# Patient Record
Sex: FEMALE | Race: WHITE | Employment: OTHER | URBAN - METROPOLITAN AREA
[De-identification: names, ages, dates, MRNs, and addresses within clinical notes are randomized per-mention and may not be internally consistent; named-entity substitution may affect disease eponyms.]

---

## 2017-03-23 ENCOUNTER — HOSPITAL ENCOUNTER (OUTPATIENT)
Dept: LAB | Age: 82
Discharge: HOME | End: 2017-03-23
Payer: MEDICARE

## 2017-03-23 LAB
BASOPHILS # BLD AUTO: 0 K/UL (ref 0–0.06)
BASOPHILS # BLD: 1 % (ref 0–2)
DIFFERENTIAL METHOD BLD: ABNORMAL
EOSINOPHIL # BLD: 0.1 K/UL (ref 0–0.4)
EOSINOPHIL NFR BLD: 2 % (ref 0–5)
ERYTHROCYTE [DISTWIDTH] IN BLOOD BY AUTOMATED COUNT: 12.5 % (ref 11.6–14.5)
HCT VFR BLD AUTO: 45 % (ref 35–45)
HGB BLD-MCNC: 15.3 G/DL (ref 12–16)
LYMPHOCYTES # BLD AUTO: 34 % (ref 21–52)
LYMPHOCYTES # BLD: 2 K/UL (ref 0.9–3.6)
MCH RBC QN AUTO: 31.7 PG (ref 24–34)
MCHC RBC AUTO-ENTMCNC: 34 G/DL (ref 31–37)
MCV RBC AUTO: 93.4 FL (ref 74–97)
MONOCYTES # BLD: 0.6 K/UL (ref 0.05–1.2)
MONOCYTES NFR BLD AUTO: 11 % (ref 3–10)
NEUTS SEG # BLD: 3 K/UL (ref 1.8–8)
NEUTS SEG NFR BLD AUTO: 52 % (ref 40–73)
PLATELET # BLD AUTO: 279 K/UL (ref 135–420)
PMV BLD AUTO: 10 FL (ref 9.2–11.8)
RBC # BLD AUTO: 4.82 M/UL (ref 4.2–5.3)
WBC # BLD AUTO: 5.8 K/UL (ref 4.6–13.2)

## 2017-03-23 PROCEDURE — 86162 COMPLEMENT TOTAL (CH50): CPT | Performed by: INTERNAL MEDICINE

## 2017-03-23 PROCEDURE — 82787 IGG 1 2 3 OR 4 EACH: CPT | Performed by: INTERNAL MEDICINE

## 2017-03-23 PROCEDURE — 86360 T CELL ABSOLUTE COUNT/RATIO: CPT | Performed by: INTERNAL MEDICINE

## 2017-03-23 PROCEDURE — 82784 ASSAY IGA/IGD/IGG/IGM EACH: CPT | Performed by: INTERNAL MEDICINE

## 2017-03-23 PROCEDURE — 36415 COLL VENOUS BLD VENIPUNCTURE: CPT | Performed by: INTERNAL MEDICINE

## 2017-03-23 PROCEDURE — 85025 COMPLETE CBC W/AUTO DIFF WBC: CPT | Performed by: INTERNAL MEDICINE

## 2017-03-24 LAB
BASOPHILS # BLD AUTO: 0 X10E3/UL (ref 0–0.2)
BASOPHILS NFR BLD AUTO: 1 %
CD3+CD4+ CELLS # BLD: 1189 /UL (ref 359–1519)
CD3+CD4+ CELLS NFR BLD: 56.6 % (ref 30.8–58.5)
CD3+CD4+ CELLS/CD3+CD8+ CLL BLD: 4.26 % (ref 0.92–3.72)
CD3+CD8+ CELLS # BLD: 279 /UL (ref 109–897)
CD3+CD8+ CELLS NFR BLD: 13.3 % (ref 12–35.5)
CH50 SERPL-ACNC: >60 U/ML (ref 42–60)
EOSINOPHIL # BLD AUTO: 0.2 X10E3/UL (ref 0–0.4)
EOSINOPHIL NFR BLD AUTO: 3 %
ERYTHROCYTE [DISTWIDTH] IN BLOOD BY AUTOMATED COUNT: 13.5 % (ref 12.3–15.4)
HCT VFR BLD AUTO: 44.1 % (ref 34–46.6)
HGB BLD-MCNC: 15.2 G/DL (ref 11.1–15.9)
IMM GRANULOCYTES # BLD: 0 X10E3/UL (ref 0–0.1)
IMM GRANULOCYTES NFR BLD: 0 %
LYMPHOCYTES # BLD AUTO: 2.1 X10E3/UL (ref 0.7–3.1)
LYMPHOCYTES NFR BLD AUTO: 35 %
MCH RBC QN AUTO: 31.9 PG (ref 26.6–33)
MCHC RBC AUTO-ENTMCNC: 34.5 G/DL (ref 31.5–35.7)
MCV RBC AUTO: 93 FL (ref 79–97)
MONOCYTES # BLD AUTO: 0.7 X10E3/UL (ref 0.1–0.9)
MONOCYTES NFR BLD AUTO: 12 %
NEUTROPHILS # BLD AUTO: 3 X10E3/UL (ref 1.4–7)
NEUTROPHILS NFR BLD AUTO: 49 %
PLATELET # BLD AUTO: 299 X10E3/UL (ref 150–379)
RBC # BLD AUTO: 4.76 X10E6/UL (ref 3.77–5.28)
WBC # BLD AUTO: 6 X10E3/UL (ref 3.4–10.8)

## 2017-03-25 LAB
IGG SERPL-MCNC: 806 MG/DL (ref 700–1600)
IGG1 SER-MCNC: 441 MG/DL (ref 422–1292)
IGG2 SER-MCNC: 216 MG/DL (ref 117–747)
IGG3 SER-MCNC: 55 MG/DL (ref 41–129)
IGG4 SER-MCNC: 5 MG/DL (ref 1–291)

## 2017-03-27 LAB
IGA SERPL-MCNC: 274 MG/DL (ref 64–422)
IGG SERPL-MCNC: 762 MG/DL (ref 700–1600)
IGM SERPL-MCNC: 61 MG/DL (ref 26–217)
PROT PATTERN SERPL IFE-IMP: NORMAL

## 2022-10-11 ENCOUNTER — OFFICE VISIT (OUTPATIENT)
Dept: ORTHOPEDIC SURGERY | Age: 87
End: 2022-10-11
Payer: MEDICARE

## 2022-10-11 DIAGNOSIS — M25.511 RIGHT SHOULDER PAIN, UNSPECIFIED CHRONICITY: Primary | ICD-10-CM

## 2022-10-11 DIAGNOSIS — S42.224A CLOSED 2-PART NONDISPLACED FRACTURE OF SURGICAL NECK OF RIGHT HUMERUS, INITIAL ENCOUNTER: ICD-10-CM

## 2022-10-11 PROBLEM — K58.9 IBS (IRRITABLE BOWEL SYNDROME): Status: ACTIVE | Noted: 2017-12-11

## 2022-10-11 PROBLEM — R42 VERTIGO: Status: ACTIVE | Noted: 2018-10-23

## 2022-10-11 PROBLEM — M79.671 BILATERAL FOOT PAIN: Status: ACTIVE | Noted: 2021-07-15

## 2022-10-11 PROBLEM — L84 CALLUS OF FOOT: Status: ACTIVE | Noted: 2022-02-25

## 2022-10-11 PROBLEM — R29.6 RECURRENT FALLS: Status: ACTIVE | Noted: 2022-09-24

## 2022-10-11 PROBLEM — T78.40XA ALLERGY: Status: ACTIVE | Noted: 2018-05-16

## 2022-10-11 PROBLEM — E87.1 CHRONIC HYPONATREMIA: Status: ACTIVE | Noted: 2022-07-14

## 2022-10-11 PROBLEM — M79.672 BILATERAL FOOT PAIN: Status: ACTIVE | Noted: 2021-07-15

## 2022-10-11 PROBLEM — E55.9 VITAMIN D DEFICIENCY: Status: ACTIVE | Noted: 2021-01-13

## 2022-10-11 PROBLEM — M25.9 MULTIPLE JOINT COMPLAINTS: Status: ACTIVE | Noted: 2021-01-13

## 2022-10-11 PROBLEM — M25.561 CHRONIC PAIN OF BOTH KNEES: Status: ACTIVE | Noted: 2021-07-15

## 2022-10-11 PROBLEM — G89.29 CHRONIC PAIN OF BOTH KNEES: Status: ACTIVE | Noted: 2021-07-15
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
## 2022-10-11 PROBLEM — K44.9 HIATAL HERNIA: Status: ACTIVE | Noted: 2019-09-13

## 2022-10-11 PROBLEM — M25.562 CHRONIC PAIN OF BOTH KNEES: Status: ACTIVE | Noted: 2021-07-15

## 2022-10-11 PROBLEM — S42.214A CLOSED NONDISPLACED FRACTURE OF SURGICAL NECK OF RIGHT HUMERUS: Status: ACTIVE | Noted: 2022-09-24

## 2022-10-11 PROBLEM — R55 SYNCOPE: Status: ACTIVE | Noted: 2021-11-21

## 2022-10-11 PROBLEM — M81.0 AGE-RELATED OSTEOPOROSIS WITHOUT CURRENT PATHOLOGICAL FRACTURE: Status: ACTIVE | Noted: 2018-09-20

## 2022-10-11 PROCEDURE — G8536 NO DOC ELDER MAL SCRN: HCPCS | Performed by: ORTHOPAEDIC SURGERY

## 2022-10-11 PROCEDURE — 99203 OFFICE O/P NEW LOW 30 MIN: CPT | Performed by: ORTHOPAEDIC SURGERY

## 2022-10-11 PROCEDURE — G9717 DOC PT DX DEP/BP F/U NT REQ: HCPCS | Performed by: ORTHOPAEDIC SURGERY

## 2022-10-11 PROCEDURE — 23600 CLTX PROX HUMRL FX W/O MNPJ: CPT | Performed by: ORTHOPAEDIC SURGERY

## 2022-10-11 PROCEDURE — 1101F PT FALLS ASSESS-DOCD LE1/YR: CPT | Performed by: ORTHOPAEDIC SURGERY

## 2022-10-11 PROCEDURE — 1090F PRES/ABSN URINE INCON ASSESS: CPT | Performed by: ORTHOPAEDIC SURGERY

## 2022-10-11 PROCEDURE — G8427 DOCREV CUR MEDS BY ELIG CLIN: HCPCS | Performed by: ORTHOPAEDIC SURGERY

## 2022-10-11 PROCEDURE — 1123F ACP DISCUSS/DSCN MKR DOCD: CPT | Performed by: ORTHOPAEDIC SURGERY

## 2022-10-11 PROCEDURE — G8421 BMI NOT CALCULATED: HCPCS | Performed by: ORTHOPAEDIC SURGERY

## 2022-10-11 RX ORDER — PANTOPRAZOLE SODIUM 40 MG/1
40 TABLET, DELAYED RELEASE ORAL
COMMUNITY
Start: 2022-09-27 | End: 2022-10-27

## 2022-10-11 RX ORDER — BUDESONIDE AND FORMOTEROL FUMARATE DIHYDRATE 80; 4.5 UG/1; UG/1
2 AEROSOL RESPIRATORY (INHALATION)
COMMUNITY

## 2022-10-11 RX ORDER — METHYLPREDNISOLONE 4 MG/1
TABLET ORAL
COMMUNITY
Start: 2022-07-18

## 2022-10-11 RX ORDER — SODIUM CHLORIDE TAB 1 GM 1 G
1 TAB MISCELLANEOUS 2 TIMES DAILY WITH MEALS
COMMUNITY
Start: 2022-09-26 | End: 2022-10-26

## 2022-10-11 RX ORDER — POTASSIUM CHLORIDE 750 MG/1
TABLET, FILM COATED, EXTENDED RELEASE ORAL
COMMUNITY
Start: 2022-09-26

## 2022-10-11 RX ORDER — CELECOXIB 100 MG/1
100 CAPSULE ORAL 2 TIMES DAILY
COMMUNITY
Start: 2022-08-31

## 2022-10-11 RX ORDER — BUSPIRONE HYDROCHLORIDE 5 MG/1
TABLET ORAL
COMMUNITY
Start: 2022-07-14

## 2022-10-11 RX ORDER — OMEPRAZOLE 40 MG/1
CAPSULE, DELAYED RELEASE ORAL
COMMUNITY
Start: 2022-07-20

## 2022-10-11 RX ORDER — DICLOFENAC SODIUM 30 MG/G
100 GEL TOPICAL 2 TIMES DAILY
COMMUNITY
Start: 2022-07-14

## 2022-10-11 RX ORDER — ERGOCALCIFEROL 1.25 MG/1
CAPSULE ORAL
COMMUNITY
Start: 2022-07-14

## 2022-10-11 RX ORDER — ACETAMINOPHEN, DIPHENHYDRAMINE HCL, PHENYLEPHRINE HCL 325; 25; 5 MG/1; MG/1; MG/1
1 TABLET ORAL
COMMUNITY

## 2022-10-11 NOTE — PROGRESS NOTES
Name: Zaheer Bhardwaj    : 1935     Service Dept: [address] [place] [address] and Sports Medicine    Chief Complaint   Patient presents with    Shoulder Pain    Wrist Pain    Arm Pain        There were no vitals taken for this visit. Allergies   Allergen Reactions    Erythromycin Hives, Itching and Swelling    Codeine Other (comments)    Doxycycline Itching    Penicillins Hives and Itching     Other reaction(s): unknown        Current Outpatient Medications   Medication Sig Dispense Refill    diclofenac (SOLARAZE) 3 % topical gel Apply 100 g to affected area two (2) times a day. pantoprazole (PROTONIX) 40 mg tablet Take 40 mg by mouth.      sodium chloride 1 gram tablet Take 1 g by mouth two (2) times daily (with meals). budesonide-formoteroL (SYMBICORT) 80-4.5 mcg/actuation HFAA Take 2 Puffs by inhalation. busPIRone (BUSPAR) 5 mg tablet       celecoxib (CELEBREX) 100 mg capsule Take 100 mg by mouth two (2) times a day. Vitamin D2 1,250 mcg (50,000 unit) capsule       melatonin 10 mg tab Take 1 Tablet by mouth nightly as needed. methylPREDNISolone (MEDROL DOSEPACK) 4 mg tablet use as directed DO NOT TAKE NSAIDS WITH THIS MEDICATION. IF DIABECTIC MONITOR BLOOD SUGARS CLOSELY      potassium chloride SR (KLOR-CON 10) 10 mEq tablet       tiotropium (SPIRIVA) 18 mcg inhalation capsule Take 1 Capsule by inhalation daily. omeprazole (PRILOSEC) 40 mg capsule       albuterol (PROVENTIL HFA, VENTOLIN HFA, PROAIR HFA) 90 mcg/actuation inhaler Take 2 Puffs by inhalation every four (4) hours as needed for Wheezing. citalopram (CELEXA) 20 mg tablet Take 20 mg by mouth daily. montelukast (SINGULAIR) 10 mg tablet Take 10 mg by mouth daily. simvastatin (ZOCOR) 40 mg tablet Take 40 mg by mouth nightly. B.infantis-B.ani-B.long-B.bifi (PROBIOTIC 4X) 10-15 mg Tab Take  by mouth daily.         fluticasone (FLONASE) 50 mcg/actuation nasal spray 2 Sprays by Both Nostrils route daily. CETIRIZINE HCL (ZYRTEC PO) Take  by mouth daily. Patient Active Problem List   Diagnosis Code    Hallux rigidus, acquired M20.20    Chronic pain of both knees M25.561, M25.562, G89.29    Callus of foot L84    Bronchial asthma J45.909    Bilateral foot pain M79.671, M79.672    Allergy T78.40XA    Age-related osteoporosis without current pathological fracture M81.0    Accidental fall W19. XXXA    Vertigo R42    DJD (degenerative joint disease) M19.90    Closed nondisplaced fracture of surgical neck of right humerus S42.214A    Chronic hyponatremia E87.1    Hypercholesterolemia E78.00    Hiatal hernia K44.9    GERD (gastroesophageal reflux disease) K21.9    Recurrent falls R29.6    Neck pain M54.2    Multiple joint complaints M25.9    Moderate persistent asthma without complication B99.41    Anxiety and depression F41.9, F32.A    IBS (irritable bowel syndrome) K58.9    Vitamin D deficiency E55.9    Syncope R55      History reviewed. No pertinent family history. Social History     Socioeconomic History    Marital status:    Tobacco Use    Smoking status: Never    Smokeless tobacco: Never   Substance and Sexual Activity    Alcohol use: Yes     Comment: seldom    Drug use: No      Past Surgical History:   Procedure Laterality Date    HX GYN      vaginal repair      Past Medical History:   Diagnosis Date    Arthritis     Asthma     Diverticulitis     hx of    GERD (gastroesophageal reflux disease)     Hiatal hernia     Nausea & vomiting         I have reviewed and agree with PFSH and ROS and intake form in chart and the record furthermore I have reviewed prior medical record(s) regarding this patients care during this appointment.      Review of Systems:   Patient is a pleasant appearing individual, appropriately dressed, well hydrated, well nourished, who is alert, appropriately oriented for age, and in no acute distress with a wheelchair bound gait and normal affect who does not appear to be in any significant pain. Physical Exam:  Right upper extremity - neurovascularly intact with good cap refill, decreased range of motion actively and passively secondary to pain, positive weakness, positive tenderness to palpation in the area of the fracture, minimal swelling, positive echymosis, skin is intact. Left upper extremity - grossly neurovascularly intact. Full Range of motion, No Weakness with abduction, No Point Tenderness, No skin lesion are identified, No instabilty is noted, No apprehension. No cuts or abrasions are identified. Encounter Diagnoses     ICD-10-CM ICD-9-CM   1. Right shoulder pain, unspecified chronicity  M25.511 719.41   2. Closed 2-part nondisplaced fracture of surgical neck of right humerus, initial encounter  S42.224A 812.01       HPI:  The patient is here with a chief complaint of right shoulder pain, throbbing, burning pain, diagnosed with proximal neck fracture. Pain is 9/10. X-rays are positive for well aligned fracture. X-rays are positive for right two-parts fracture with slight displacement. Assessment/Plan:  Plan at this point, my recommendation will be for ice, elevate, antiinflammatories, physical therapy and we will see the patient back as needed or in 3 to  4 weeks for one final check. No restrictions in the meantime and go from there. As part of continued conservative pain management options the patient was advised to utilize Tylenol or OTC NSAIDS as long as it is not medically contraindicated. Return to Office: Follow-up and Dispositions    Return in about 4 weeks (around 11/8/2022) for w/ xrays, w/ Margaret Spurling. Scribed by Omari Smith LPN as dictated by [facility] JAM Kellogg MD.  Documentation True and Accepted Nitin Kellogg MD

## 2022-10-11 NOTE — PATIENT INSTRUCTIONS
Shoulder Pain: Care Instructions  Your Care Instructions     You can hurt your shoulder by using it too much during an activity, such as fishing or baseball. It can also happen as part of the everyday wear and tear of getting older. Shoulder injuries can be slow to heal, but your shoulder should get better with time. Your doctor may recommend a sling to rest your shoulder. If you have injured your shoulder, you may need testing and treatment. Follow-up care is a key part of your treatment and safety. Be sure to make and go to all appointments, and call your doctor if you are having problems. It's also a good idea to know your test results and keep a list of the medicines you take. How can you care for yourself at home? Take pain medicines exactly as directed. If the doctor gave you a prescription medicine for pain, take it as prescribed. If you are not taking a prescription pain medicine, ask your doctor if you can take an over-the-counter medicine. Do not take two or more pain medicines at the same time unless the doctor told you to. Many pain medicines contain acetaminophen, which is Tylenol. Too much acetaminophen (Tylenol) can be harmful. If your doctor recommends that you wear a sling, use it as directed. Do not take it off before your doctor tells you to. Put ice or a cold pack on the sore area for 10 to 20 minutes at a time. Put a thin cloth between the ice and your skin. If there is no swelling, you can put moist heat, a heating pad, or a warm cloth on your shoulder. Some doctors suggest alternating between hot and cold. Rest your shoulder for a few days. If your doctor recommends it, you can then begin gentle exercise of the shoulder, but do not lift anything heavy. When should you call for help? Call 911 anytime you think you may need emergency care. For example, call if:    You have chest pain or pressure. This may occur with:  Sweating. Shortness of breath. Nausea or vomiting.   Pain that spreads from the chest to the neck, jaw, or one or both shoulders or arms. Dizziness or lightheadedness. A fast or uneven pulse. After calling 911, chew 1 adult-strength aspirin. Wait for an ambulance. Do not try to drive yourself. Your arm or hand is cool or pale or changes color. Call your doctor now or seek immediate medical care if:    You have signs of infection, such as: Increased pain, swelling, warmth, or redness in your shoulder. Red streaks leading from a place on your shoulder. Pus draining from an area of your shoulder. Swollen lymph nodes in your neck, armpits, or groin. A fever. Watch closely for changes in your health, and be sure to contact your doctor if:    You cannot use your shoulder. Your shoulder does not get better as expected. Where can you learn more? Go to http://www.chou.com/  Enter H996 in the search box to learn more about \"Shoulder Pain: Care Instructions. \"  Current as of: July 1, 2021               Content Version: 13.2  © 2006-2022 [employer]. Care instructions adapted under license by [employer] (which disclaims liability or warranty for this information). If you have questions about a medical condition or this instruction, always ask your healthcare professional. [address] any warranty or liability for your use of this information.

## 2022-11-17 ENCOUNTER — OFFICE VISIT (OUTPATIENT)
Dept: ORTHOPEDIC SURGERY | Age: 87
End: 2022-11-17
Payer: MEDICARE

## 2022-11-17 DIAGNOSIS — S42.291D OTHER CLOSED DISPLACED FRACTURE OF PROXIMAL END OF RIGHT HUMERUS WITH ROUTINE HEALING, SUBSEQUENT ENCOUNTER: Primary | ICD-10-CM

## 2022-11-17 PROBLEM — I70.0 ATHEROSCLEROSIS OF AORTA (HCC): Status: ACTIVE | Noted: 2022-11-04

## 2022-11-17 PROCEDURE — 99024 POSTOP FOLLOW-UP VISIT: CPT | Performed by: NURSE PRACTITIONER

## 2022-11-17 RX ORDER — POTASSIUM CHLORIDE 20MEQ/15ML
LIQUID (ML) ORAL
COMMUNITY
Start: 2022-10-15

## 2022-11-17 RX ORDER — OXYCODONE HYDROCHLORIDE 5 MG/1
5 TABLET ORAL EVERY 12 HOURS
COMMUNITY
Start: 2022-10-26

## 2022-11-17 NOTE — PROGRESS NOTES
Name: Nick Reynoso    : 1935    Weight Loss Metrics 3/7/2016 2013 2013   Today's Wt 111 lb - 115 lb   BMI 20.98 kg/m2 21.74 kg/m2 -       There is no height or weight on file to calculate BMI. Service Dept: [address] [place] [address] and Sports Medicine    Patient's Pharmacies:  No Pharmacies Listed     Chief Complaint   Patient presents with    Shoulder Pain       HPI:  Patient follows up today for reevaluation and angelita-ray of a right proximal humerus fracture. She is almost 8 weeks out from that injury. She is no longer in a sling and has been attending physical therapy. Overall she states she is doing well with minimal discomfort. There were no vitals taken for this visit. Allergies   Allergen Reactions    Erythromycin Hives, Itching and Swelling    Codeine Other (comments)    Doxycycline Itching    Penicillins Hives and Itching     Other reaction(s): unknown  Other reaction(s): unknown  Other reaction(s): unknown      Current Outpatient Medications   Medication Sig Dispense Refill    oxyCODONE IR (ROXICODONE) 5 mg immediate release tablet Take 5 mg by mouth every twelve (12) hours. potassium chloride (KAON 10%) 20 mEq/15 mL solution TAKE 15ML BY MOUTH ONCE A DAY      budesonide-formoteroL (SYMBICORT) 80-4.5 mcg/actuation HFAA Take 2 Puffs by inhalation. busPIRone (BUSPAR) 5 mg tablet       celecoxib (CELEBREX) 100 mg capsule Take 100 mg by mouth two (2) times a day. diclofenac (SOLARAZE) 3 % topical gel Apply 100 g to affected area two (2) times a day. Vitamin D2 1,250 mcg (50,000 unit) capsule       melatonin 10 mg tab Take 1 Tablet by mouth nightly as needed. methylPREDNISolone (MEDROL DOSEPACK) 4 mg tablet use as directed DO NOT TAKE NSAIDS WITH THIS MEDICATION.  IF DIABECTIC MONITOR BLOOD SUGARS CLOSELY      potassium chloride SR (KLOR-CON 10) 10 mEq tablet       tiotropium (SPIRIVA) 18 mcg inhalation capsule Take 1 Capsule by inhalation daily.      omeprazole (PRILOSEC) 40 mg capsule       albuterol (PROVENTIL HFA, VENTOLIN HFA, PROAIR HFA) 90 mcg/actuation inhaler Take 2 Puffs by inhalation every four (4) hours as needed for Wheezing. citalopram (CELEXA) 20 mg tablet Take 20 mg by mouth daily. montelukast (SINGULAIR) 10 mg tablet Take 10 mg by mouth daily. simvastatin (ZOCOR) 40 mg tablet Take 40 mg by mouth nightly. B.infantis-B.ani-B.long-B.bifi (PROBIOTIC 4X) 10-15 mg Tab Take  by mouth daily. fluticasone (FLONASE) 50 mcg/actuation nasal spray 2 Sprays by Both Nostrils route daily. CETIRIZINE HCL (ZYRTEC PO) Take  by mouth daily. Patient Active Problem List   Diagnosis Code    Hallux rigidus, acquired M20.20    Chronic pain of both knees M25.561, M25.562, G89.29    Callus of foot L84    Bronchial asthma J45.909    Bilateral foot pain M79.671, M79.672    Allergy T78.40XA    Age-related osteoporosis without current pathological fracture M81.0    Accidental fall W19. XXXA    Vertigo R42    DJD (degenerative joint disease) M19.90    Closed nondisplaced fracture of surgical neck of right humerus S42.214A    Chronic hyponatremia E87.1    Hypercholesterolemia E78.00    Hiatal hernia K44.9    GERD (gastroesophageal reflux disease) K21.9    Recurrent falls R29.6    Neck pain M54.2    Multiple joint complaints M25.9    Moderate persistent asthma without complication X93.28    Anxiety and depression F41.9, F32.A    IBS (irritable bowel syndrome) K58.9    Vitamin D deficiency E55.9    Syncope R55    Atherosclerosis of aorta (HCC) I70.0      History reviewed. No pertinent family history.    Social History     Socioeconomic History    Marital status:    Tobacco Use    Smoking status: Never    Smokeless tobacco: Never   Substance and Sexual Activity    Alcohol use: Yes     Comment: seldom    Drug use: No      Past Surgical History:   Procedure Laterality Date    HX GYN      vaginal repair      Past Medical History:   Diagnosis Date    Arthritis     Asthma     Diverticulitis     hx of    GERD (gastroesophageal reflux disease)     Hiatal hernia     Nausea & vomiting         I have reviewed and agree with [address] and ROS and intake form in chart and the record furthermore I have reviewed prior medical record(s) regarding this patients care during this appointment. Physical Exam:  On physical exam today patient is able to forward elevate her arm to about 60 degrees. There is still some mild palpable tenderness over the proximal humerus. New x-rays taken today of the patient's right shoulder show her fracture to have good interval healing with good callus formation. Encounter Diagnoses     ICD-10-CM ICD-9-CM   1. Other closed displaced fracture of proximal end of right humerus with routine healing, subsequent encounter  S42.291D V54.11       As part of continued conservative pain management options the patient was advised to utilize Tylenol or OTC NSAIDS as long as it is not medically contraindicated. Return to Office:   Today I reviewed the clinical and radiographic findings with the patient and her daughter. She will continue with physical therapy and home exercises. I advised that she should abstain from weightbearing with the right arm for at least another month. If she feels good at that point she can begin unrestricted use of the right upper extremity. As long she is doing well no further routine x-rays will be scheduled but she can be in touch with us anytime if issues arise.          [address] [facility]

## 2023-11-19 PROBLEM — G56.01 RIGHT CARPAL TUNNEL SYNDROME: Status: ACTIVE | Noted: 2023-11-19

## 2023-11-19 PROBLEM — M25.531 RIGHT WRIST PAIN: Status: ACTIVE | Noted: 2023-11-19

## 2023-12-21 ENCOUNTER — HOSPITAL ENCOUNTER (OUTPATIENT)
Facility: HOSPITAL | Age: 88
Discharge: HOME | End: 2023-12-24
Payer: MEDICARE

## 2023-12-21 DIAGNOSIS — Z01.818 PRE-OP TESTING: ICD-10-CM

## 2023-12-21 LAB
APTT PPP: 32.3 SEC (ref 23–36.4)
INR PPP: 1 (ref 0.9–1.1)
PROTHROMBIN TIME: 13.6 SEC (ref 11.9–14.7)

## 2023-12-21 PROCEDURE — 85730 THROMBOPLASTIN TIME PARTIAL: CPT

## 2023-12-21 PROCEDURE — 36415 COLL VENOUS BLD VENIPUNCTURE: CPT

## 2023-12-21 PROCEDURE — 85610 PROTHROMBIN TIME: CPT

## 2024-03-14 PROBLEM — M65.30 TRIGGER FINGER, LEFT: Status: ACTIVE | Noted: 2024-03-14

## 2024-03-14 PROBLEM — G56.02 CARPAL TUNNEL SYNDROME ON LEFT: Status: ACTIVE | Noted: 2024-03-14

## 2024-03-14 NOTE — H&P
History and Physical        Patient: Donna Lynn               Sex: female          DOA: (Not on file)         YOB: 1935      Age:  88 y.o.        LOS:  LOS: 0 days        HPI:     Donna Lynn is a 88 y.o. female who complains of left hand and wrist pain.  She noticed pain and numbness and tingling in the left hand and fingers.  She states their hand does feel weak; therefore, they are very limited.  She rates their pain as 6/10.  It is a dull aching pain that is worse with any type of activity and feels better at rest.  She is also complaining that her 4th digit on the left will get stuck on her at times and she will have to release the finger for it to open up.  The patient has been taking NSAIDS.  They have obtained an EMG/nerve conduction study which shows  severe  carpal tunnel syndrome in their left hand.   The patient denies any other manifestations.  At this time the patient's pain effects their activities of daily living and would like to move forward with surgical intervention.       Past Medical History:   Diagnosis Date    Anxiety and depression 1993    Buspirone daily    Arthritis     \"all over\"    Asthma 1979    adult onset. Hx \"mold at my old house\" has albuterol inhaler PRN.    Chronic pain     \"all over\" hx steroid injections    Diverticulitis     hx of    GERD (gastroesophageal reflux disease)     Pantoprazole    Hard of hearing     \"I need hearing aids\" -doesn't have as of 12/18/23.    Hiatal hernia     History of abnormal electrocardiogram     bifascicular block on EKG- no cardiologist    History of low potassium     currently on oral K+    Hx of echocardiogram 11/21/2023    \"Normal LV systolic function, EF 65-70%%\"    Hx of fall 09/2022    fractured right shoulder/humerus- no surgery    Hyperlipidemia 1989    pt denies meds. PCP Luis in  [place]    Osteoporosis     Tylenol as needed. managed by Dr. Webb.    PONV (postoperative nausea and vomiting)     Right

## 2024-03-14 NOTE — DISCHARGE INSTRUCTIONS
[facility]  Dr. Rodriguez’s Post-Operative Instructions Carpal Tunnel Release    Diet:  1. Begin with liquids and light foods such as Jell-O and soups.  2. Advance as tolerated to your regular diet if not nauseated.    First 24 hours:  1. Be in the care of a responsible adult.  2. Do not drive or operate machinery.  3. Do not drink alcoholic beverages.    Activities:  1. Elevate the operative hand and ice for the next 48 hours; 20 minutes on / 20 minutes off  2.. Return to work depends on your type of employment.  3.  Follow-up with Dr. Rodriguez in 7-10 days post-operatively.    Wound Care:  1. Maintain your postoperative dressing. Loosen the ACE wrap if swelling of the fingers occurs.  2. Remove your surgical dressing on the third post op day. Cover the wound with Band-Aids and then place on the Velcro splint that was given pre-operatively.    3. Keep the surgical incisions dry until your sutures are removed when you see your doctor. Use a plastic bag with rubber bands to cover the limb during showers. Immersing the limb in water is to be avoided.    Medications:  1. Strong oral narcotic pain medications have been prescribed for the first few days. Use only as directed. No pain medication is capable of taking away all the pain. Taking your pills at regular intervals will give you the best chance of having less pain.  2. If you need a refill PLEASE PLAN AHEAD. Call our office during regular hours (8-5).  3. Do not combine with alcoholic beverages.  4. Be careful as you walk, climb stairs or drive as mild dizziness is not unusual.  5. Do not take medications that have not been prescribed by your surgeon.  6. You may switch to over the counter pain medication of your choice as you become more comfortable.    WHEN TO CALL YOUR SURGEON:  1. Significant swelling or any new numbness in the limb that was operated on  2. Unrelenting pain  3. Fever or Chills  4. Redness around incisions  5. Color change in the fingers or hand  6.

## 2024-03-18 ENCOUNTER — TRANSCRIBE ORDERS (OUTPATIENT)
Facility: HOSPITAL | Age: 89
End: 2024-03-18

## 2024-03-18 ENCOUNTER — HOSPITAL ENCOUNTER (OUTPATIENT)
Facility: HOSPITAL | Age: 89
Discharge: HOME | End: 2024-03-21
Payer: MEDICARE

## 2024-03-18 DIAGNOSIS — G56.02 CARPAL TUNNEL SYNDROME OF LEFT WRIST: ICD-10-CM

## 2024-03-18 DIAGNOSIS — G56.02 CARPAL TUNNEL SYNDROME OF LEFT WRIST: Primary | ICD-10-CM

## 2024-03-18 LAB
ALBUMIN SERPL-MCNC: 3.6 G/DL (ref 3.4–5)
ALBUMIN/GLOB SERPL: 1.2 (ref 0.8–1.7)
ALP SERPL-CCNC: 83 U/L (ref 45–117)
ALT SERPL-CCNC: 11 U/L (ref 13–56)
ANION GAP SERPL CALC-SCNC: 7 MMOL/L (ref 3–18)
APTT PPP: 30.9 SEC (ref 23–36.4)
AST SERPL-CCNC: 12 U/L (ref 10–38)
BILIRUB SERPL-MCNC: 0.8 MG/DL (ref 0.2–1)
BUN SERPL-MCNC: 5 MG/DL (ref 7–18)
BUN/CREAT SERPL: 9 (ref 12–20)
CALCIUM SERPL-MCNC: 9.4 MG/DL (ref 8.5–10.1)
CHLORIDE SERPL-SCNC: 96 MMOL/L (ref 100–111)
CO2 SERPL-SCNC: 27 MMOL/L (ref 21–32)
CREAT SERPL-MCNC: 0.58 MG/DL (ref 0.6–1.3)
EKG ATRIAL RATE: 66 BPM
EKG DIAGNOSIS: NORMAL
EKG P AXIS: 79 DEGREES
EKG P-R INTERVAL: 168 MS
EKG Q-T INTERVAL: 450 MS
EKG QRS DURATION: 148 MS
EKG QTC CALCULATION (BAZETT): 471 MS
EKG R AXIS: -38 DEGREES
EKG T AXIS: 60 DEGREES
EKG VENTRICULAR RATE: 66 BPM
ERYTHROCYTE [DISTWIDTH] IN BLOOD BY AUTOMATED COUNT: 12.5 % (ref 11.6–14.5)
GLOBULIN SER CALC-MCNC: 3.1 G/DL (ref 2–4)
GLUCOSE SERPL-MCNC: 103 MG/DL (ref 74–99)
HCT VFR BLD AUTO: 39.6 % (ref 35–45)
HGB BLD-MCNC: 13.8 G/DL (ref 12–16)
INR PPP: 1 (ref 0.9–1.1)
MCH RBC QN AUTO: 32.6 PG (ref 24–34)
MCHC RBC AUTO-ENTMCNC: 34.8 G/DL (ref 31–37)
MCV RBC AUTO: 93.6 FL (ref 78–100)
NRBC # BLD: 0 K/UL (ref 0–0.01)
NRBC BLD-RTO: 0 PER 100 WBC
PLATELET # BLD AUTO: 344 K/UL (ref 135–420)
PMV BLD AUTO: 8.7 FL (ref 9.2–11.8)
POTASSIUM SERPL-SCNC: 4 MMOL/L (ref 3.5–5.5)
PROT SERPL-MCNC: 6.7 G/DL (ref 6.4–8.2)
PROTHROMBIN TIME: 13.1 SEC (ref 11.9–14.7)
RBC # BLD AUTO: 4.23 M/UL (ref 4.2–5.3)
SODIUM SERPL-SCNC: 130 MMOL/L (ref 136–145)
WBC # BLD AUTO: 6.1 K/UL (ref 4.6–13.2)

## 2024-03-18 PROCEDURE — 80053 COMPREHEN METABOLIC PANEL: CPT

## 2024-03-18 PROCEDURE — 85027 COMPLETE CBC AUTOMATED: CPT

## 2024-03-18 PROCEDURE — 93005 ELECTROCARDIOGRAM TRACING: CPT

## 2024-03-18 PROCEDURE — 36415 COLL VENOUS BLD VENIPUNCTURE: CPT

## 2024-03-18 PROCEDURE — 85610 PROTHROMBIN TIME: CPT

## 2024-03-18 PROCEDURE — 85730 THROMBOPLASTIN TIME PARTIAL: CPT

## 2024-03-19 ENCOUNTER — ANESTHESIA EVENT (OUTPATIENT)
Facility: HOSPITAL | Age: 89
End: 2024-03-19
Payer: MEDICARE

## 2024-03-20 LAB
BACTERIA SPEC CULT: NORMAL
BACTERIA SPEC CULT: NORMAL
SERVICE CMNT-IMP: NORMAL

## 2024-03-26 ENCOUNTER — HOSPITAL ENCOUNTER (OUTPATIENT)
Facility: HOSPITAL | Age: 89
Setting detail: OUTPATIENT SURGERY
Discharge: HOME | End: 2024-03-26
Attending: ORTHOPAEDIC SURGERY | Admitting: ORTHOPAEDIC SURGERY
Payer: MEDICARE

## 2024-03-26 ENCOUNTER — ANESTHESIA (OUTPATIENT)
Facility: HOSPITAL | Age: 89
End: 2024-03-26
Payer: MEDICARE

## 2024-03-26 VITALS
RESPIRATION RATE: 16 BRPM | HEIGHT: 60 IN | HEART RATE: 81 BPM | WEIGHT: 97.9 LBS | DIASTOLIC BLOOD PRESSURE: 59 MMHG | BODY MASS INDEX: 19.22 KG/M2 | OXYGEN SATURATION: 98 % | TEMPERATURE: 98 F | SYSTOLIC BLOOD PRESSURE: 157 MMHG

## 2024-03-26 DIAGNOSIS — M65.342 TRIGGER RING FINGER OF LEFT HAND: Primary | ICD-10-CM

## 2024-03-26 LAB — SODIUM SERPL-SCNC: 132 MMOL/L (ref 136–145)

## 2024-03-26 PROCEDURE — 3600000012 HC SURGERY LEVEL 2 ADDTL 15MIN: Performed by: ORTHOPAEDIC SURGERY

## 2024-03-26 PROCEDURE — 7100000010 HC PHASE II RECOVERY - FIRST 15 MIN: Performed by: ORTHOPAEDIC SURGERY

## 2024-03-26 PROCEDURE — 6360000002 HC RX W HCPCS: Performed by: ORTHOPAEDIC SURGERY

## 2024-03-26 PROCEDURE — 2709999900 HC NON-CHARGEABLE SUPPLY: Performed by: ORTHOPAEDIC SURGERY

## 2024-03-26 PROCEDURE — 3700000001 HC ADD 15 MINUTES (ANESTHESIA): Performed by: ORTHOPAEDIC SURGERY

## 2024-03-26 PROCEDURE — 7100000001 HC PACU RECOVERY - ADDTL 15 MIN: Performed by: ORTHOPAEDIC SURGERY

## 2024-03-26 PROCEDURE — 7100000011 HC PHASE II RECOVERY - ADDTL 15 MIN: Performed by: ORTHOPAEDIC SURGERY

## 2024-03-26 PROCEDURE — A4217 STERILE WATER/SALINE, 500 ML: HCPCS | Performed by: ORTHOPAEDIC SURGERY

## 2024-03-26 PROCEDURE — 6360000002 HC RX W HCPCS: Performed by: ANESTHESIOLOGY

## 2024-03-26 PROCEDURE — 36415 COLL VENOUS BLD VENIPUNCTURE: CPT

## 2024-03-26 PROCEDURE — 2580000003 HC RX 258: Performed by: ORTHOPAEDIC SURGERY

## 2024-03-26 PROCEDURE — 7100000000 HC PACU RECOVERY - FIRST 15 MIN: Performed by: ORTHOPAEDIC SURGERY

## 2024-03-26 PROCEDURE — 3600000002 HC SURGERY LEVEL 2 BASE: Performed by: ORTHOPAEDIC SURGERY

## 2024-03-26 PROCEDURE — 3700000000 HC ANESTHESIA ATTENDED CARE: Performed by: ORTHOPAEDIC SURGERY

## 2024-03-26 PROCEDURE — 84295 ASSAY OF SERUM SODIUM: CPT

## 2024-03-26 PROCEDURE — 2500000003 HC RX 250 WO HCPCS: Performed by: ANESTHESIOLOGY

## 2024-03-26 RX ORDER — MAGNESIUM HYDROXIDE 1200 MG/15ML
LIQUID ORAL CONTINUOUS PRN
Status: COMPLETED | OUTPATIENT
Start: 2024-03-26 | End: 2024-03-26

## 2024-03-26 RX ORDER — LIDOCAINE HYDROCHLORIDE 20 MG/ML
INJECTION, SOLUTION EPIDURAL; INFILTRATION; INTRACAUDAL; PERINEURAL PRN
Status: DISCONTINUED | OUTPATIENT
Start: 2024-03-26 | End: 2024-03-26 | Stop reason: SDUPTHER

## 2024-03-26 RX ORDER — SODIUM CHLORIDE 0.9 % (FLUSH) 0.9 %
5-40 SYRINGE (ML) INJECTION EVERY 12 HOURS SCHEDULED
Status: DISCONTINUED | OUTPATIENT
Start: 2024-03-26 | End: 2024-03-26 | Stop reason: HOSPADM

## 2024-03-26 RX ORDER — GLYCOPYRROLATE 0.2 MG/ML
INJECTION INTRAMUSCULAR; INTRAVENOUS PRN
Status: DISCONTINUED | OUTPATIENT
Start: 2024-03-26 | End: 2024-03-26 | Stop reason: SDUPTHER

## 2024-03-26 RX ORDER — NALOXONE HYDROCHLORIDE 0.4 MG/ML
INJECTION, SOLUTION INTRAMUSCULAR; INTRAVENOUS; SUBCUTANEOUS PRN
Status: DISCONTINUED | OUTPATIENT
Start: 2024-03-26 | End: 2024-03-26 | Stop reason: HOSPADM

## 2024-03-26 RX ORDER — EPHEDRINE SULFATE/0.9% NACL/PF 50 MG/5 ML
SYRINGE (ML) INTRAVENOUS PRN
Status: DISCONTINUED | OUTPATIENT
Start: 2024-03-26 | End: 2024-03-26 | Stop reason: SDUPTHER

## 2024-03-26 RX ORDER — FENTANYL CITRATE 50 UG/ML
INJECTION, SOLUTION INTRAMUSCULAR; INTRAVENOUS PRN
Status: DISCONTINUED | OUTPATIENT
Start: 2024-03-26 | End: 2024-03-26 | Stop reason: SDUPTHER

## 2024-03-26 RX ORDER — TRAMADOL HYDROCHLORIDE 50 MG/1
50 TABLET ORAL EVERY 8 HOURS PRN
Qty: 15 TABLET | Refills: 0 | Status: SHIPPED | OUTPATIENT
Start: 2024-03-26 | End: 2024-03-31

## 2024-03-26 RX ORDER — SULFAMETHOXAZOLE AND TRIMETHOPRIM 800; 160 MG/1; MG/1
1 TABLET ORAL 2 TIMES DAILY
Qty: 10 TABLET | Refills: 0 | Status: SHIPPED | OUTPATIENT
Start: 2024-03-26 | End: 2024-03-31

## 2024-03-26 RX ORDER — SODIUM CHLORIDE 9 MG/ML
INJECTION, SOLUTION INTRAVENOUS PRN
Status: DISCONTINUED | OUTPATIENT
Start: 2024-03-26 | End: 2024-03-26 | Stop reason: HOSPADM

## 2024-03-26 RX ORDER — PROPOFOL 10 MG/ML
INJECTION, EMULSION INTRAVENOUS PRN
Status: DISCONTINUED | OUTPATIENT
Start: 2024-03-26 | End: 2024-03-26 | Stop reason: SDUPTHER

## 2024-03-26 RX ORDER — BUPIVACAINE HYDROCHLORIDE 2.5 MG/ML
INJECTION, SOLUTION EPIDURAL; INFILTRATION; INTRACAUDAL PRN
Status: DISCONTINUED | OUTPATIENT
Start: 2024-03-26 | End: 2024-03-26 | Stop reason: ALTCHOICE

## 2024-03-26 RX ORDER — FENTANYL CITRATE 50 UG/ML
25 INJECTION, SOLUTION INTRAMUSCULAR; INTRAVENOUS EVERY 5 MIN PRN
Status: DISCONTINUED | OUTPATIENT
Start: 2024-03-26 | End: 2024-03-26 | Stop reason: HOSPADM

## 2024-03-26 RX ORDER — TRAMADOL HYDROCHLORIDE 50 MG/1
50 TABLET ORAL ONCE
Status: DISCONTINUED | OUTPATIENT
Start: 2024-03-26 | End: 2024-03-26 | Stop reason: HOSPADM

## 2024-03-26 RX ORDER — HYDROMORPHONE HYDROCHLORIDE 1 MG/ML
0.5 INJECTION, SOLUTION INTRAMUSCULAR; INTRAVENOUS; SUBCUTANEOUS EVERY 5 MIN PRN
Status: DISCONTINUED | OUTPATIENT
Start: 2024-03-26 | End: 2024-03-26 | Stop reason: HOSPADM

## 2024-03-26 RX ORDER — ONDANSETRON 2 MG/ML
INJECTION INTRAMUSCULAR; INTRAVENOUS PRN
Status: DISCONTINUED | OUTPATIENT
Start: 2024-03-26 | End: 2024-03-26 | Stop reason: SDUPTHER

## 2024-03-26 RX ORDER — SODIUM CHLORIDE, SODIUM LACTATE, POTASSIUM CHLORIDE, CALCIUM CHLORIDE 600; 310; 30; 20 MG/100ML; MG/100ML; MG/100ML; MG/100ML
INJECTION, SOLUTION INTRAVENOUS CONTINUOUS
Status: DISCONTINUED | OUTPATIENT
Start: 2024-03-26 | End: 2024-03-26 | Stop reason: HOSPADM

## 2024-03-26 RX ORDER — SODIUM CHLORIDE 0.9 % (FLUSH) 0.9 %
5-40 SYRINGE (ML) INJECTION PRN
Status: DISCONTINUED | OUTPATIENT
Start: 2024-03-26 | End: 2024-03-26 | Stop reason: HOSPADM

## 2024-03-26 RX ADMIN — Medication 10 MG: at 13:16

## 2024-03-26 RX ADMIN — ONDANSETRON HYDROCHLORIDE 4 MG: 2 INJECTION INTRAMUSCULAR; INTRAVENOUS at 12:54

## 2024-03-26 RX ADMIN — VANCOMYCIN HYDROCHLORIDE 1000 MG: 1 INJECTION, POWDER, LYOPHILIZED, FOR SOLUTION INTRAVENOUS at 12:53

## 2024-03-26 RX ADMIN — PROPOFOL 25 MG: 10 INJECTION, EMULSION INTRAVENOUS at 13:08

## 2024-03-26 RX ADMIN — LIDOCAINE HYDROCHLORIDE 60 MG: 20 INJECTION, SOLUTION EPIDURAL; INFILTRATION; INTRACAUDAL; PERINEURAL at 13:05

## 2024-03-26 RX ADMIN — SODIUM CHLORIDE, POTASSIUM CHLORIDE, SODIUM LACTATE AND CALCIUM CHLORIDE: 600; 310; 30; 20 INJECTION, SOLUTION INTRAVENOUS at 12:10

## 2024-03-26 RX ADMIN — FENTANYL CITRATE 25 MCG: 50 INJECTION, SOLUTION INTRAMUSCULAR; INTRAVENOUS at 12:54

## 2024-03-26 RX ADMIN — FENTANYL CITRATE 25 MCG: 50 INJECTION INTRAMUSCULAR; INTRAVENOUS at 14:23

## 2024-03-26 RX ADMIN — FENTANYL CITRATE 25 MCG: 50 INJECTION INTRAMUSCULAR; INTRAVENOUS at 14:29

## 2024-03-26 RX ADMIN — PROPOFOL 100 MG: 10 INJECTION, EMULSION INTRAVENOUS at 13:05

## 2024-03-26 RX ADMIN — Medication 10 MG: at 13:14

## 2024-03-26 RX ADMIN — GLYCOPYRROLATE 0.2 MG: 0.2 INJECTION, SOLUTION INTRAMUSCULAR; INTRAVENOUS at 12:54

## 2024-03-26 ASSESSMENT — PAIN DESCRIPTION - DESCRIPTORS: DESCRIPTORS: ACHING

## 2024-03-26 ASSESSMENT — PAIN DESCRIPTION - ORIENTATION: ORIENTATION: LEFT

## 2024-03-26 ASSESSMENT — PAIN SCALES - GENERAL
PAINLEVEL_OUTOF10: 4
PAINLEVEL_OUTOF10: 0
PAINLEVEL_OUTOF10: 0
PAINLEVEL_OUTOF10: 6
PAINLEVEL_OUTOF10: 0
PAINLEVEL_OUTOF10: 6
PAINLEVEL_OUTOF10: 0

## 2024-03-26 ASSESSMENT — PAIN - FUNCTIONAL ASSESSMENT
PAIN_FUNCTIONAL_ASSESSMENT: ACTIVITIES ARE NOT PREVENTED

## 2024-03-26 ASSESSMENT — ENCOUNTER SYMPTOMS: SHORTNESS OF BREATH: 1

## 2024-03-26 ASSESSMENT — PAIN DESCRIPTION - LOCATION: LOCATION: HAND

## 2024-03-26 NOTE — INTERVAL H&P NOTE
Update History & Physical    The patient's History and Physical of March 26, chart was reviewed and I will examine the patient. There was no change. The surgical site was confirmed by the patient and me.     Plan: The risks, benefits, expected outcome, and alternative to the recommended procedure have been discussed with the patient. Patient understands and wants to proceed with the procedure.     Electronically signed by Zachery Bhakta MD on 3/26/2024 at 11:44 AM

## 2024-03-26 NOTE — PERIOP NOTE
TRANSFER - OUT REPORT:    Verbal report given to Rocio GONZALES  on Donna Lynn  being transferred to [facility]  for routine progression of patient care       Report consisted of patient's Situation, Background, Assessment and   Recommendations(SBAR).     Information from the following report(s) Adult Overview, Surgery Report, Intake/Output, and MAR was reviewed with the receiving nurse.           Lines:   Peripheral IV 03/26/24 Posterior;Right Wrist (Active)   Site Assessment Clean, dry & intact 03/26/24 1346   Line Status Infusing 03/26/24 1346   Phlebitis Assessment No symptoms 03/26/24 1210   Infiltration Assessment 0 03/26/24 1210   Alcohol Cap Used No 03/26/24 1210   Dressing Status Clean, dry & intact 03/26/24 1210   Dressing Type Transparent 03/26/24 1210        Opportunity for questions and clarification was provided.      Patient transported with:  Registered Nurse

## 2024-03-26 NOTE — ANESTHESIA PRE PROCEDURE
\"HEPCAB\"    COVID-19 Screening (If Applicable): No results found for: \"COVID19\"        Anesthesia Evaluation  Patient summary reviewed and Nursing notes reviewed   no history of anesthetic complications:   Airway: Mallampati: II  TM distance: >3 FB   Neck ROM: full  Mouth opening: > = 3 FB   Dental:          Pulmonary: breath sounds clear to auscultation  (+)   shortness of breath: chronic,         asthma: exercise-induced asthma,                            Cardiovascular:  Exercise tolerance: good (>4 METS)  (+) MONTEIRO:      ECG reviewed  Rhythm: regular  Rate: normal                    Neuro/Psych:   (+) neuromuscular disease:, psychiatric history:            GI/Hepatic/Renal:   (+) hiatal hernia, GERD:          Endo/Other:                      ROS comment: Sodium disorder hypnatremia Abdominal:             Vascular:          Other Findings:       Anesthesia Plan      general     ASA 3       Induction: intravenous.      Anesthetic plan and risks discussed with patient.                    ENOCH SPENCE MD   3/26/2024

## 2024-03-26 NOTE — PERIOP NOTE
Reviewed PTA medication list with patient/caregiver and patient/caregiver denies any additional medications.     Patient admits to having a responsible adult care for them at home for at least 24 hours after surgery.    Patient encouraged to use gown warming system and informed that using said warming gown to regulate body temperature prior to a procedure has been shown to help reduce the risks of blood clots and infection.    Patient's pharmacy of choice verified and documented in PTA medication section.    Dual skin assessment & fall risk band verification completed with TYE Rodgers RN.

## 2024-03-26 NOTE — ANESTHESIA POSTPROCEDURE EVALUATION
Department of Anesthesiology  Postprocedure Note    Patient: Donna Lynn  MRN: [id number]  YOB: 1935  Date of evaluation: 3/26/2024    Procedure Summary       Date: 03/26/24 Room / Location: [facility] MAIN 08 / [facility] MAIN OR    Anesthesia Start: 1254 Anesthesia Stop: 1334    Procedures:       LEFT OPEN CARPAL TUNNEL RELEASE, (Left: Hand)      FOURTH DIGIT LEFT HAND TRIGGER FINGER RELEASE  (SPEC POP) (Left: Fingers) Diagnosis:       Carpal tunnel syndrome on left      (Carpal tunnel syndrome on left [G56.02])    Surgeons: Zachery Rodriguez MD Responsible Provider: Nina Fuentes MD    Anesthesia Type: General ASA Status: 3            Anesthesia Type: General    Steve Phase I:      Steve Phase II:      Anesthesia Post Evaluation    Patient location during evaluation: PACU  Patient participation: complete - patient participated  Level of consciousness: awake  Pain score: 0  Airway patency: patent  Nausea & Vomiting: no nausea and no vomiting  Cardiovascular status: blood pressure returned to baseline  Respiratory status: acceptable  Hydration status: stable  Multimodal analgesia pain management approach  Pain management: satisfactory to patient    No notable events documented.

## 2024-03-26 NOTE — PERIOP NOTE
TRANSFER - IN REPORT:    Verbal report received from ANDRE Burnette RN on Donna Lynn  being received from PACU for routine post-op      Report consisted of patient's Situation, Background, Assessment and   Recommendations(SBAR).     Information from the following report(s) Nurse Handoff Report, Adult Overview, Surgery Report, and Intake/Output was reviewed with the receiving nurse.    Opportunity for questions and clarification was provided.      Assessment completed upon patient's arrival to unit and care assumed.

## 2024-03-26 NOTE — PERIOP NOTE
Discharge instructions reviewed with patient and family.  Opportunity for questions and answers given.  No further questions at this time.    Encouraged coughing and deep breathing - tolerating po fluids - vss

## 2024-03-26 NOTE — PERIOP NOTE
TRANSFER - IN REPORT:    Verbal report received from Dr. Fuentes, OR nurse julio Lynn  being received from OR  for routine progression of patient care      Report consisted of patient's Situation, Background, Assessment and   Recommendations(SBAR).     Information from the following report(s) Adult Overview, Surgery Report, Intake/Output, and MAR was reviewed with the receiving nurse.    Opportunity for questions and clarification was provided.      Assessment completed upon patient's arrival to unit and care assumed.

## 2024-03-26 NOTE — OP NOTE
OPERATIVE NOTE    Patient: Donna Lynn MRN: [id number]  SSN: [id number]    YOB: 1935  Age: 88 y.o.  Sex: female      Indications: This is a 88 y.o. year-old female who presents with a left carpal tunnel and 4th digit trigger.  The patient was admitted for surgery as conservative measures have failed.    Date of Procedure: 3/26/2024     Preoperative Diagnosis: Carpal tunnel syndrome on left [G56.02], Trigger finger left hand 4th (ring) digit.     Postoperative Diagnosis: * No post-op diagnosis entered *      Procedure: Procedure(s):  LEFT OPEN CARPAL TUNNEL RELEASE,  FOURTH DIGIT LEFT HAND TRIGGER FINGER RELEASE  (SPEC POP)    Surgeon: Zachery Bhakta DO and Surgical Assistant: Oxana Ta NP    Assistant: Circulator: Natalie Rajan RN; Renetta Nagy RN  Nurse Practitioner: Oxana Ta APRN - NP  Surgical Assistant: Zachery Brooks  Scrub Person First: Sunita James      Anesthesia: GETA    Estimated Blood Loss: <10cc    Specimens: * No specimens in log *     Drains: none    Implants: * No implants in log *    Complications: None; patient tolerated the procedure well.    Procedure: The patient was greeted by anesthesia and taken to the operative suite, where she underwent general endotracheal anesthesia.  The patient was positioned in the supine position on a standard orthopedic table.  The left arm was sterilely prepped and draped in a standard fashion.  The arm was exsanguinated with an Esmarch bandage, which was utilized as a tourniquet.  A 1-inch incision was made over the palmar flexion crease in line with the radial aspect of the fourth ray.  This was made with a 15 scalpel.  The palmar fascia was transected.  Retractors were positioned .  The central aspect of the deep transverse carpal ligament was respected utilizing a 15 blade scalpel.  A freer elevator was passed into the carpal tunnel above the tendinous and neurological structures, and the distal aspect of the deep transverse carpal

## (undated) DEVICE — GOWN,SIRUS,NONRNF,SETINSLV,2XL,18/CS: Brand: MEDLINE

## (undated) DEVICE — GLOVE SURG SZ 65 CRM LTX FREE POLYISOPRENE POLYMER BEAD ANTI

## (undated) DEVICE — BNDG,ELSTC,MATRIX,STRL,3"X5YD,LF,HOOK&LP: Brand: MEDLINE

## (undated) DEVICE — 3M™ TEGADERM™ TRANSPARENT FILM DRESSING FRAME STYLE, 1626W, 4 IN X 4-3/4 IN (10 CM X 12 CM), 50/CT 4CT/CASE: Brand: 3M™ TEGADERM™

## (undated) DEVICE — SUTURE N ABSRB L 18 IN SZ 4-0 NDL L 19 MM NYL MONOFILAMENT

## (undated) DEVICE — BANDAGE,ELASTIC,ESMARK,STERILE,4"X9',LF: Brand: MEDLINE

## (undated) DEVICE — PADDING,UNDERCAST,COTTON, 3X4YD STERILE: Brand: MEDLINE

## (undated) DEVICE — CURITY NON-ADHERENT STRIPS: Brand: CURITY

## (undated) DEVICE — GLOVE SURG SZ 9 THK91MIL LTX FREE SYN POLYISOPRENE ANTI

## (undated) DEVICE — GLOVE SURG SZ 7 L12IN FNGR THK79MIL GRN LTX FREE

## (undated) DEVICE — SUTURE ETHLN SZ 3-0 L18IN NONABSORBABLE BLK PS-2 L19MM 3/8 1669H

## (undated) DEVICE — SOLUTION IRRIG 500ML 0.9% SOD CHLO USP POUR PLAS BTL

## (undated) DEVICE — DRESSING STERILE PETRO W3XL8IN N ADH OIL EMUL GZ CURAD

## (undated) DEVICE — GARMENT,MEDLINE,DVT,INT,CALF,MED, GEN2: Brand: MEDLINE

## (undated) DEVICE — GLOVE SURG SZ 65 L12IN FNGR THK79MIL GRN LTX FREE

## (undated) DEVICE — 3M™ TEGADERM™ TRANSPARENT FILM DRESSING FRAME STYLE, 1624W, 2-3/8 IN X 2-3/4 IN (6 CM X 7 CM), 100/CT 4CT/CASE: Brand: 3M™ TEGADERM™

## (undated) DEVICE — PACK PROCEDURE SURG EXTREMITY CUST

## (undated) DEVICE — SPLINT CAST W3INXL15FT FELT BRTH DBL SIDE QUIK STP

## (undated) DEVICE — APPLICATOR MEDICATED 26 CC SOLUTION HI LT ORNG CHLORAPREP